# Patient Record
Sex: MALE | Race: BLACK OR AFRICAN AMERICAN | NOT HISPANIC OR LATINO | URBAN - METROPOLITAN AREA
[De-identification: names, ages, dates, MRNs, and addresses within clinical notes are randomized per-mention and may not be internally consistent; named-entity substitution may affect disease eponyms.]

---

## 2021-09-29 ENCOUNTER — INPATIENT (INPATIENT)
Facility: HOSPITAL | Age: 81
LOS: 2 days | Discharge: HOME | End: 2021-10-02
Attending: INTERNAL MEDICINE | Admitting: INTERNAL MEDICINE
Payer: MEDICARE

## 2021-09-29 VITALS
DIASTOLIC BLOOD PRESSURE: 102 MMHG | HEART RATE: 79 BPM | OXYGEN SATURATION: 99 % | RESPIRATION RATE: 17 BRPM | HEIGHT: 68 IN | TEMPERATURE: 98 F | SYSTOLIC BLOOD PRESSURE: 169 MMHG | WEIGHT: 154.1 LBS

## 2021-09-29 DIAGNOSIS — K62.5 HEMORRHAGE OF ANUS AND RECTUM: ICD-10-CM

## 2021-09-29 DIAGNOSIS — I10 ESSENTIAL (PRIMARY) HYPERTENSION: ICD-10-CM

## 2021-09-29 DIAGNOSIS — K52.9 NONINFECTIVE GASTROENTERITIS AND COLITIS, UNSPECIFIED: ICD-10-CM

## 2021-09-29 DIAGNOSIS — Z85.038 PERSONAL HISTORY OF OTHER MALIGNANT NEOPLASM OF LARGE INTESTINE: ICD-10-CM

## 2021-09-29 LAB
ALBUMIN SERPL ELPH-MCNC: 3.7 G/DL — SIGNIFICANT CHANGE UP (ref 3.3–5)
ALP SERPL-CCNC: 63 U/L — SIGNIFICANT CHANGE UP (ref 40–120)
ALT FLD-CCNC: 25 U/L — SIGNIFICANT CHANGE UP (ref 12–78)
ANION GAP SERPL CALC-SCNC: 4 MMOL/L — LOW (ref 5–17)
APPEARANCE UR: CLEAR — SIGNIFICANT CHANGE UP
AST SERPL-CCNC: 15 U/L — SIGNIFICANT CHANGE UP (ref 15–37)
BASOPHILS # BLD AUTO: 0.02 K/UL — SIGNIFICANT CHANGE UP (ref 0–0.2)
BASOPHILS NFR BLD AUTO: 0.3 % — SIGNIFICANT CHANGE UP (ref 0–2)
BILIRUB SERPL-MCNC: 0.9 MG/DL — SIGNIFICANT CHANGE UP (ref 0.2–1.2)
BILIRUB UR-MCNC: NEGATIVE — SIGNIFICANT CHANGE UP
BUN SERPL-MCNC: 17 MG/DL — SIGNIFICANT CHANGE UP (ref 7–23)
CALCIUM SERPL-MCNC: 9 MG/DL — SIGNIFICANT CHANGE UP (ref 8.5–10.1)
CHLORIDE SERPL-SCNC: 103 MMOL/L — SIGNIFICANT CHANGE UP (ref 96–108)
CO2 SERPL-SCNC: 30 MMOL/L — SIGNIFICANT CHANGE UP (ref 22–31)
COLOR SPEC: YELLOW — SIGNIFICANT CHANGE UP
CREAT SERPL-MCNC: 1.24 MG/DL — SIGNIFICANT CHANGE UP (ref 0.5–1.3)
DIFF PNL FLD: NEGATIVE — SIGNIFICANT CHANGE UP
EOSINOPHIL # BLD AUTO: 0.08 K/UL — SIGNIFICANT CHANGE UP (ref 0–0.5)
EOSINOPHIL NFR BLD AUTO: 1.1 % — SIGNIFICANT CHANGE UP (ref 0–6)
FLUAV AG NPH QL: SIGNIFICANT CHANGE UP
FLUBV AG NPH QL: SIGNIFICANT CHANGE UP
GLUCOSE SERPL-MCNC: 133 MG/DL — HIGH (ref 70–99)
GLUCOSE UR QL: NEGATIVE MG/DL — SIGNIFICANT CHANGE UP
HCT VFR BLD CALC: 40.7 % — SIGNIFICANT CHANGE UP (ref 39–50)
HGB BLD-MCNC: 13.9 G/DL — SIGNIFICANT CHANGE UP (ref 13–17)
IMM GRANULOCYTES NFR BLD AUTO: 0.1 % — SIGNIFICANT CHANGE UP (ref 0–1.5)
KETONES UR-MCNC: NEGATIVE — SIGNIFICANT CHANGE UP
LACTATE SERPL-SCNC: 1.7 MMOL/L — SIGNIFICANT CHANGE UP (ref 0.7–2)
LEUKOCYTE ESTERASE UR-ACNC: NEGATIVE — SIGNIFICANT CHANGE UP
LYMPHOCYTES # BLD AUTO: 1.42 K/UL — SIGNIFICANT CHANGE UP (ref 1–3.3)
LYMPHOCYTES # BLD AUTO: 18.8 % — SIGNIFICANT CHANGE UP (ref 13–44)
MCHC RBC-ENTMCNC: 29.7 PG — SIGNIFICANT CHANGE UP (ref 27–34)
MCHC RBC-ENTMCNC: 34.2 GM/DL — SIGNIFICANT CHANGE UP (ref 32–36)
MCV RBC AUTO: 87 FL — SIGNIFICANT CHANGE UP (ref 80–100)
MONOCYTES # BLD AUTO: 0.55 K/UL — SIGNIFICANT CHANGE UP (ref 0–0.9)
MONOCYTES NFR BLD AUTO: 7.3 % — SIGNIFICANT CHANGE UP (ref 2–14)
NEUTROPHILS # BLD AUTO: 5.46 K/UL — SIGNIFICANT CHANGE UP (ref 1.8–7.4)
NEUTROPHILS NFR BLD AUTO: 72.4 % — SIGNIFICANT CHANGE UP (ref 43–77)
NITRITE UR-MCNC: NEGATIVE — SIGNIFICANT CHANGE UP
NRBC # BLD: 0 /100 WBCS — SIGNIFICANT CHANGE UP (ref 0–0)
OB PNL STL: POSITIVE
PH UR: 5 — SIGNIFICANT CHANGE UP (ref 5–8)
PLATELET # BLD AUTO: 155 K/UL — SIGNIFICANT CHANGE UP (ref 150–400)
POTASSIUM SERPL-MCNC: 3.8 MMOL/L — SIGNIFICANT CHANGE UP (ref 3.5–5.3)
POTASSIUM SERPL-SCNC: 3.8 MMOL/L — SIGNIFICANT CHANGE UP (ref 3.5–5.3)
PROT SERPL-MCNC: 8 GM/DL — SIGNIFICANT CHANGE UP (ref 6–8.3)
PROT UR-MCNC: NEGATIVE MG/DL — SIGNIFICANT CHANGE UP
RBC # BLD: 4.68 M/UL — SIGNIFICANT CHANGE UP (ref 4.2–5.8)
RBC # FLD: 14.4 % — SIGNIFICANT CHANGE UP (ref 10.3–14.5)
SARS-COV-2 RNA SPEC QL NAA+PROBE: SIGNIFICANT CHANGE UP
SODIUM SERPL-SCNC: 137 MMOL/L — SIGNIFICANT CHANGE UP (ref 135–145)
SP GR SPEC: 1.01 — SIGNIFICANT CHANGE UP (ref 1.01–1.02)
UROBILINOGEN FLD QL: NEGATIVE MG/DL — SIGNIFICANT CHANGE UP
WBC # BLD: 7.54 K/UL — SIGNIFICANT CHANGE UP (ref 3.8–10.5)
WBC # FLD AUTO: 7.54 K/UL — SIGNIFICANT CHANGE UP (ref 3.8–10.5)

## 2021-09-29 PROCEDURE — 99285 EMERGENCY DEPT VISIT HI MDM: CPT

## 2021-09-29 PROCEDURE — 99223 1ST HOSP IP/OBS HIGH 75: CPT

## 2021-09-29 PROCEDURE — 74177 CT ABD & PELVIS W/CONTRAST: CPT | Mod: 26,MA

## 2021-09-29 RX ORDER — CEFTRIAXONE 500 MG/1
1000 INJECTION, POWDER, FOR SOLUTION INTRAMUSCULAR; INTRAVENOUS EVERY 24 HOURS
Refills: 0 | Status: DISCONTINUED | OUTPATIENT
Start: 2021-09-29 | End: 2021-09-30

## 2021-09-29 RX ORDER — PIPERACILLIN AND TAZOBACTAM 4; .5 G/20ML; G/20ML
3.38 INJECTION, POWDER, LYOPHILIZED, FOR SOLUTION INTRAVENOUS ONCE
Refills: 0 | Status: COMPLETED | OUTPATIENT
Start: 2021-09-29 | End: 2021-09-29

## 2021-09-29 RX ORDER — ONDANSETRON 8 MG/1
4 TABLET, FILM COATED ORAL EVERY 8 HOURS
Refills: 0 | Status: DISCONTINUED | OUTPATIENT
Start: 2021-09-29 | End: 2021-10-02

## 2021-09-29 RX ORDER — SODIUM CHLORIDE 9 MG/ML
1000 INJECTION, SOLUTION INTRAVENOUS
Refills: 0 | Status: DISCONTINUED | OUTPATIENT
Start: 2021-09-29 | End: 2021-10-01

## 2021-09-29 RX ORDER — ACETAMINOPHEN 500 MG
650 TABLET ORAL EVERY 6 HOURS
Refills: 0 | Status: DISCONTINUED | OUTPATIENT
Start: 2021-09-29 | End: 2021-10-02

## 2021-09-29 RX ORDER — LANOLIN ALCOHOL/MO/W.PET/CERES
3 CREAM (GRAM) TOPICAL AT BEDTIME
Refills: 0 | Status: DISCONTINUED | OUTPATIENT
Start: 2021-09-29 | End: 2021-10-02

## 2021-09-29 RX ORDER — METRONIDAZOLE 500 MG
500 TABLET ORAL EVERY 8 HOURS
Refills: 0 | Status: DISCONTINUED | OUTPATIENT
Start: 2021-09-29 | End: 2021-10-02

## 2021-09-29 RX ADMIN — PIPERACILLIN AND TAZOBACTAM 200 GRAM(S): 4; .5 INJECTION, POWDER, LYOPHILIZED, FOR SOLUTION INTRAVENOUS at 16:45

## 2021-09-29 RX ADMIN — SODIUM CHLORIDE 75 MILLILITER(S): 9 INJECTION, SOLUTION INTRAVENOUS at 19:53

## 2021-09-29 RX ADMIN — SODIUM CHLORIDE 75 MILLILITER(S): 9 INJECTION, SOLUTION INTRAVENOUS at 21:31

## 2021-09-29 RX ADMIN — Medication 100 MILLIGRAM(S): at 21:30

## 2021-09-29 RX ADMIN — CEFTRIAXONE 100 MILLIGRAM(S): 500 INJECTION, POWDER, FOR SOLUTION INTRAMUSCULAR; INTRAVENOUS at 19:52

## 2021-09-29 NOTE — ED ADULT NURSE NOTE - CHIEF COMPLAINT QUOTE
pt a&O x4, pt c.o abd pain 9/10. no n/v/d. last BM yesterday with blood. Not passing gas, abd distended/bloated. no fever chills, PMH prostate and colon CA 2014.  no current home medications. pt also c.o fo rectal pain worsening during BM.

## 2021-09-29 NOTE — H&P ADULT - NSHPPHYSICALEXAM_GEN_ALL_CORE
CONSTITUTIONAL: Well developed, well nourished, alert and cooperative, no acute distress. BP-141/89 , HR-79 , RR-17  EYES: PERRL, EOMI, no scleral icterus  ENT: Mucosa moist, tongue normal.  NECK: Neck supple, trachea midline, non-tender, no masses or thyromegaly.  CARDIAC: Normal S1 and S2. Regular rate and rhythms. No murmurs. No JVD. No Pedal edema. Peripheral pulses intact  LUNGS: Clear to auscultation, equal air entry both lungs. No rales, rhonchi, wheezing. Normal respiratory effort.   ABDOMEN: Soft, but noted mild tenderness in lower abdomen region, more on left side. No guarding or rebound tenderness. No hepatomegaly or splenomegaly. Bowel sound normal  MUSCULOSKELETAL: Normocephalic, atraumatic. Spine normal without deformity or tenderness, no CVA tenderness. No clubbing or cyanosis. No significant deformity or joint abnormality. No varicosities.  NEUROLOGICAL: No gross motor or sensory deficits. CN II-XII grossly intact. DTR normal  SKIN: no lesions or eruptions. Normal turgor  LYMPHATICS: No cervical, axillary, inguinal lymphadenopathy  PSYCHIATRIC: A&O x 3, appropriate mood and affect. Normal insight and judgement. CONSTITUTIONAL: Well developed, well nourished, alert and cooperative, no acute distress. BP-141/89 , HR-79 , RR-17  EYES: PERRL, EOMI, no scleral icterus  ENT: Mucosa moist, tongue normal.  NECK: Neck supple, trachea midline, non-tender, no masses or thyromegaly.  CARDIAC: Normal S1 and S2. Regular rate and rhythms. No murmurs. No JVD. No Pedal edema. Peripheral pulses intact  LUNGS: Clear to auscultation, equal air entry both lungs. No rales, rhonchi, wheezing. Normal respiratory effort.   ABDOMEN: Soft, but noted mild tenderness in lower abdomen region, more on left side. No guarding or rebound tenderness. No hepatomegaly or splenomegaly. Bowel sound normal  MUSCULOSKELETAL: Normocephalic, atraumatic. Spine normal without deformity or tenderness. No clubbing or cyanosis. No significant deformity or joint abnormality  NEUROLOGICAL: No gross motor or sensory deficits  SKIN: no lesions or eruptions. Normal turgor  PSYCHIATRIC: A&O x 3, appropriate mood and affect. Normal insight and judgement.

## 2021-09-29 NOTE — ED ADULT NURSE REASSESSMENT NOTE - NS ED NURSE REASSESS COMMENT FT1
pt is in bed, AX4, all results are in and md david called in to discuss, most likely to be admitted.

## 2021-09-29 NOTE — H&P ADULT - PROBLEM SELECTOR PLAN 2
Reported seeing a few drop of blood with last 2 bowel movement  Hb-13.9, plt-155  Fecal occult blood positive  Likely due to colitis  Continue to monitor  If worsen, consider GI consult in AM, otherwise outpatient GI follow up

## 2021-09-29 NOTE — ED ADULT NURSE NOTE - OBJECTIVE STATEMENT
assisting primary RN. Patient presents awake, alert and oriented x4 complaining of generalized abdominal and rectal pain since yesterday afternoon. Endorses drops of blood in stool. Alie nvd, chest pain, dizziness, fevers. PMH colon cancer (2013), prostate cancer (2013), PSH open heart surgery, NKA.

## 2021-09-29 NOTE — H&P ADULT - HISTORY OF PRESENT ILLNESS
80 years old male with h/o HTN ( not on meds), h/o colon ca (2013, s/p resection and chemo) and prostate cancer (2015 s/o cyberknife treatment) present to ED with complain of severe abdominal pain for 1 day. Patient reported constant, 10/10, sharp lower abdominal pain, associated with a few drop of blood in his bowel movement yesterday. Denied any fever or chills. No nausea or vomiting.   Hemodynamically stable in ED, afebrile. No leukocytosis, Hb-13.9, plt-155, lactate 1.7, fecal occult blood positive. CT abd/pelvis reported severe colitis involving mid to distal descending colon and rectosigmoid colon. 2 small nonspecific splenic lesions in the superior spleen. 2 tiny hypodensities in the left hepatic lobe of unlikely clinical significance.Small left adrenal nodule should be followed with CT or MR in 12 months.   Patient had another bowel movement in ED, also notice a few drop of blood per patient. Patient was given 1 dose of Zosyn in ED.       PMH: as in HPI  PSH: colon cancer surgery, prostate cyberknife surgery  SH: no toxic habits  FH: mother-HTN  Allergy: NKDA  Med: not taking any prescription

## 2021-09-29 NOTE — ED PROVIDER NOTE - OBJECTIVE STATEMENT
80 year old male with h/o HTN, colon ca (2013, s/p resection and chemo) and prostate cancer (2015 s/o cyberknife treatment) presnts today accompanied with his wife c/o lower abdominal pain and bloating since last night associated with no bowel movement, his wife reports that he was in pain and felt warm so she gave him tylenol, pt also noted red blood with his bowel movement 80 year old male with h/o colon ca (2013, s/p resection and chemo) and prostate cancer (2015 s/o cyberknife treatment) presnts today accompanied with his wife c/o lower abdominal pain and bloating since last night associated with no bowel movement, his wife reports that he was in pain and felt warm so she gave him tylenol, pt also noted red blood with his bowel movement

## 2021-09-29 NOTE — H&P ADULT - PROBLEM SELECTOR PLAN 1
Present to ED with severe abd pain  CT abd/pelvis reported severe colitis involving mid to distal descending colon and rectosigmoid colon  Received Zosyn in ED  Ceftriaxone and metronidazole   Follow blood culture  Clear liquid diet and advance as tolerated  Gentle IV hydration

## 2021-09-29 NOTE — ED ADULT NURSE NOTE - NSIMPLEMENTINTERV_GEN_ALL_ED
Implemented All Universal Safety Interventions:  [place] to call system. Call bell, personal items and telephone within reach. Instruct patient to call for assistance. Room bathroom lighting operational. Non-slip footwear when patient is off stretcher. Physically safe environment: no spills, clutter or unnecessary equipment. Stretcher in lowest position, wheels locked, appropriate side rails in place.

## 2021-09-29 NOTE — H&P ADULT - NSHPREVIEWOFSYSTEMS_GEN_ALL_CORE
CONSTITUTIONAL: No fever, chills or weight loss  EYES: No eye pain. No vision changes  ENT:  No hearing changes or pain, No nasal congestion.  Cardiovascular:  No Chest Pain, palpitation, SOB orthopnea or PND. No extremity swelling  Respiratory:  No cough, SOB or wheezing. No hemoptysis.   Gastrointestinal:  severe lower abdominal pain, a few drop of blood with bowel movement  Genitourinary: No dysuria, frequency or hematuria  Musculoskeletal:  No myalgia or joint pain  Skin:  No skin lesion, rash or pruritis  Neuro:  No weakness, numbness, loss of consciousness, syncope, dizziness or seizures  Psych:  No anxiety, depression or insomnia. No hallucination. No recent changes in mood  Heme/Lymph:  No easy bruising or bleeding. No enlarged lymph nodes  Endocrine:  No Polyuria, polydipsia, No heat or cold intolerance.  ALLERGIC/IMMUNOlOGIC: No seasonal allergy, hives, hay fever symptoms

## 2021-09-29 NOTE — H&P ADULT - ASSESSMENT
80 years old male with h/o HTN ( not on meds), h/o colon ca (2013, s/p resection and chemo) and prostate cancer (2015 s/o cyberknife treatment) present to ED with complain of severe abdominal pain for 1 day.  Hemodynamically stable in ED, afebrile. No leukocytosis, Hb-13.9, plt-155, lactate 1.7, fecal occult blood positive. CT abd/pelvis reported severe colitis involving mid to distal descending colon and rectosigmoid colon. 2 small nonspecific splenic lesions in the superior spleen. 2 tiny hypodensities in the left hepatic lobe of unlikely clinical significance. Small left adrenal nodule should be followed with CT or MR in 12 months.   Patient had another bowel movement in ED, also notice a few drop of blood per patient. Patient was given 1 dose of Zosyn in ED    Admitted with bright red blood per rectum with severe colitis

## 2021-09-30 LAB
ANION GAP SERPL CALC-SCNC: 5 MMOL/L — SIGNIFICANT CHANGE UP (ref 5–17)
BUN SERPL-MCNC: 14 MG/DL — SIGNIFICANT CHANGE UP (ref 7–23)
CALCIUM SERPL-MCNC: 8.6 MG/DL — SIGNIFICANT CHANGE UP (ref 8.5–10.1)
CHLORIDE SERPL-SCNC: 106 MMOL/L — SIGNIFICANT CHANGE UP (ref 96–108)
CO2 SERPL-SCNC: 28 MMOL/L — SIGNIFICANT CHANGE UP (ref 22–31)
CREAT SERPL-MCNC: 0.97 MG/DL — SIGNIFICANT CHANGE UP (ref 0.5–1.3)
CULTURE RESULTS: SIGNIFICANT CHANGE UP
GLUCOSE SERPL-MCNC: 87 MG/DL — SIGNIFICANT CHANGE UP (ref 70–99)
HCT VFR BLD CALC: 39.3 % — SIGNIFICANT CHANGE UP (ref 39–50)
HGB BLD-MCNC: 13 G/DL — SIGNIFICANT CHANGE UP (ref 13–17)
MAGNESIUM SERPL-MCNC: 2.2 MG/DL — SIGNIFICANT CHANGE UP (ref 1.6–2.6)
MCHC RBC-ENTMCNC: 29.4 PG — SIGNIFICANT CHANGE UP (ref 27–34)
MCHC RBC-ENTMCNC: 33.1 GM/DL — SIGNIFICANT CHANGE UP (ref 32–36)
MCV RBC AUTO: 88.9 FL — SIGNIFICANT CHANGE UP (ref 80–100)
NRBC # BLD: 0 /100 WBCS — SIGNIFICANT CHANGE UP (ref 0–0)
PHOSPHATE SERPL-MCNC: 3.1 MG/DL — SIGNIFICANT CHANGE UP (ref 2.5–4.5)
PLATELET # BLD AUTO: 133 K/UL — LOW (ref 150–400)
POTASSIUM SERPL-MCNC: 3.5 MMOL/L — SIGNIFICANT CHANGE UP (ref 3.5–5.3)
POTASSIUM SERPL-SCNC: 3.5 MMOL/L — SIGNIFICANT CHANGE UP (ref 3.5–5.3)
RBC # BLD: 4.42 M/UL — SIGNIFICANT CHANGE UP (ref 4.2–5.8)
RBC # FLD: 14.4 % — SIGNIFICANT CHANGE UP (ref 10.3–14.5)
SODIUM SERPL-SCNC: 139 MMOL/L — SIGNIFICANT CHANGE UP (ref 135–145)
SPECIMEN SOURCE: SIGNIFICANT CHANGE UP
WBC # BLD: 6.88 K/UL — SIGNIFICANT CHANGE UP (ref 3.8–10.5)
WBC # FLD AUTO: 6.88 K/UL — SIGNIFICANT CHANGE UP (ref 3.8–10.5)

## 2021-09-30 PROCEDURE — 99232 SBSQ HOSP IP/OBS MODERATE 35: CPT

## 2021-09-30 RX ORDER — CIPROFLOXACIN LACTATE 400MG/40ML
400 VIAL (ML) INTRAVENOUS EVERY 12 HOURS
Refills: 0 | Status: DISCONTINUED | OUTPATIENT
Start: 2021-09-30 | End: 2021-10-02

## 2021-09-30 RX ORDER — INFLUENZA VIRUS VACCINE 15; 15; 15; 15 UG/.5ML; UG/.5ML; UG/.5ML; UG/.5ML
0.5 SUSPENSION INTRAMUSCULAR ONCE
Refills: 0 | Status: DISCONTINUED | OUTPATIENT
Start: 2021-09-30 | End: 2021-10-02

## 2021-09-30 RX ORDER — LIDOCAINE 4 G/100G
1 CREAM TOPICAL EVERY 12 HOURS
Refills: 0 | Status: DISCONTINUED | OUTPATIENT
Start: 2021-09-30 | End: 2021-10-02

## 2021-09-30 RX ORDER — LISINOPRIL 2.5 MG/1
5 TABLET ORAL DAILY
Refills: 0 | Status: DISCONTINUED | OUTPATIENT
Start: 2021-09-30 | End: 2021-10-02

## 2021-09-30 RX ORDER — HYDROCORTISONE 1 %
1 OINTMENT (GRAM) TOPICAL DAILY
Refills: 0 | Status: DISCONTINUED | OUTPATIENT
Start: 2021-09-30 | End: 2021-10-02

## 2021-09-30 RX ADMIN — Medication 1 SUPPOSITORY(S): at 21:09

## 2021-09-30 RX ADMIN — Medication 200 MILLIGRAM(S): at 19:08

## 2021-09-30 RX ADMIN — Medication 100 MILLIGRAM(S): at 13:35

## 2021-09-30 RX ADMIN — LISINOPRIL 5 MILLIGRAM(S): 2.5 TABLET ORAL at 19:08

## 2021-09-30 RX ADMIN — Medication 100 MILLIGRAM(S): at 21:09

## 2021-09-30 RX ADMIN — Medication 100 MILLIGRAM(S): at 05:22

## 2021-09-30 RX ADMIN — CEFTRIAXONE 100 MILLIGRAM(S): 500 INJECTION, POWDER, FOR SOLUTION INTRAMUSCULAR; INTRAVENOUS at 17:20

## 2021-09-30 RX ADMIN — LIDOCAINE 1 APPLICATION(S): 4 CREAM TOPICAL at 19:08

## 2021-09-30 NOTE — PROGRESS NOTE ADULT - ASSESSMENT
80 years old male with h/o HTN ( not on meds), h/o colon ca (2013, s/p resection and chemo) and prostate cancer (2015 s/o cyberknife treatment) present to ED with complain of severe abdominal pain for 1 day.  H & H has been stable and no rectal bleeding. May get an outpatient GI evaluation.  If still stable in am and tolerating diet may switch to PO antibiotics and d/c home.

## 2021-09-30 NOTE — PROGRESS NOTE ADULT - PROBLEM SELECTOR PLAN 2
Chest tightness and discomfort for about 2 hours, activity and movement make tightness worse, also worse with deep breaths. Denies hx asthma or cardiac hx. Also c/o some SOB and a HA that has since resolved.   Hb-13.9, plt-155  Fecal occult blood positive  Likely due to colitis  Continue to monitor  If worsen, consider GI consult in AM, otherwise outpatient GI follow up

## 2021-09-30 NOTE — PROGRESS NOTE ADULT - PROBLEM SELECTOR PLAN 1
CT abd/pelvis reported severe colitis involving mid to distal descending colon and rectosigmoid colon  Continue cipro and metronidazole   Follow blood culture  Clear liquid diet and advance as tolerated  Gentle IV hydration

## 2021-10-01 LAB
ALBUMIN SERPL ELPH-MCNC: 3.1 G/DL — LOW (ref 3.3–5)
ALP SERPL-CCNC: 54 U/L — SIGNIFICANT CHANGE UP (ref 40–120)
ALT FLD-CCNC: 17 U/L — SIGNIFICANT CHANGE UP (ref 12–78)
ANION GAP SERPL CALC-SCNC: 3 MMOL/L — LOW (ref 5–17)
AST SERPL-CCNC: 23 U/L — SIGNIFICANT CHANGE UP (ref 15–37)
BASOPHILS # BLD AUTO: 0.01 K/UL — SIGNIFICANT CHANGE UP (ref 0–0.2)
BASOPHILS NFR BLD AUTO: 0.2 % — SIGNIFICANT CHANGE UP (ref 0–2)
BILIRUB SERPL-MCNC: 0.7 MG/DL — SIGNIFICANT CHANGE UP (ref 0.2–1.2)
BUN SERPL-MCNC: 12 MG/DL — SIGNIFICANT CHANGE UP (ref 7–23)
CALCIUM SERPL-MCNC: 9.3 MG/DL — SIGNIFICANT CHANGE UP (ref 8.5–10.1)
CHLORIDE SERPL-SCNC: 103 MMOL/L — SIGNIFICANT CHANGE UP (ref 96–108)
CO2 SERPL-SCNC: 30 MMOL/L — SIGNIFICANT CHANGE UP (ref 22–31)
CREAT SERPL-MCNC: 0.87 MG/DL — SIGNIFICANT CHANGE UP (ref 0.5–1.3)
EOSINOPHIL # BLD AUTO: 0.28 K/UL — SIGNIFICANT CHANGE UP (ref 0–0.5)
EOSINOPHIL NFR BLD AUTO: 5.1 % — SIGNIFICANT CHANGE UP (ref 0–6)
GLUCOSE SERPL-MCNC: 125 MG/DL — HIGH (ref 70–99)
HCT VFR BLD CALC: 40.7 % — SIGNIFICANT CHANGE UP (ref 39–50)
HGB BLD-MCNC: 13.4 G/DL — SIGNIFICANT CHANGE UP (ref 13–17)
IMM GRANULOCYTES NFR BLD AUTO: 0.2 % — SIGNIFICANT CHANGE UP (ref 0–1.5)
LYMPHOCYTES # BLD AUTO: 1.46 K/UL — SIGNIFICANT CHANGE UP (ref 1–3.3)
LYMPHOCYTES # BLD AUTO: 26.4 % — SIGNIFICANT CHANGE UP (ref 13–44)
MCHC RBC-ENTMCNC: 29.3 PG — SIGNIFICANT CHANGE UP (ref 27–34)
MCHC RBC-ENTMCNC: 32.9 GM/DL — SIGNIFICANT CHANGE UP (ref 32–36)
MCV RBC AUTO: 88.9 FL — SIGNIFICANT CHANGE UP (ref 80–100)
MONOCYTES # BLD AUTO: 0.51 K/UL — SIGNIFICANT CHANGE UP (ref 0–0.9)
MONOCYTES NFR BLD AUTO: 9.2 % — SIGNIFICANT CHANGE UP (ref 2–14)
NEUTROPHILS # BLD AUTO: 3.25 K/UL — SIGNIFICANT CHANGE UP (ref 1.8–7.4)
NEUTROPHILS NFR BLD AUTO: 58.9 % — SIGNIFICANT CHANGE UP (ref 43–77)
NRBC # BLD: 0 /100 WBCS — SIGNIFICANT CHANGE UP (ref 0–0)
PLATELET # BLD AUTO: 141 K/UL — LOW (ref 150–400)
POTASSIUM SERPL-MCNC: 3.9 MMOL/L — SIGNIFICANT CHANGE UP (ref 3.5–5.3)
POTASSIUM SERPL-SCNC: 3.9 MMOL/L — SIGNIFICANT CHANGE UP (ref 3.5–5.3)
PROT SERPL-MCNC: 7.1 GM/DL — SIGNIFICANT CHANGE UP (ref 6–8.3)
RBC # BLD: 4.58 M/UL — SIGNIFICANT CHANGE UP (ref 4.2–5.8)
RBC # FLD: 14.3 % — SIGNIFICANT CHANGE UP (ref 10.3–14.5)
SODIUM SERPL-SCNC: 136 MMOL/L — SIGNIFICANT CHANGE UP (ref 135–145)
WBC # BLD: 5.52 K/UL — SIGNIFICANT CHANGE UP (ref 3.8–10.5)
WBC # FLD AUTO: 5.52 K/UL — SIGNIFICANT CHANGE UP (ref 3.8–10.5)

## 2021-10-01 PROCEDURE — 99232 SBSQ HOSP IP/OBS MODERATE 35: CPT

## 2021-10-01 RX ORDER — AMLODIPINE BESYLATE 2.5 MG/1
5 TABLET ORAL DAILY
Refills: 0 | Status: DISCONTINUED | OUTPATIENT
Start: 2021-10-01 | End: 2021-10-02

## 2021-10-01 RX ADMIN — LIDOCAINE 1 APPLICATION(S): 4 CREAM TOPICAL at 05:50

## 2021-10-01 RX ADMIN — Medication 100 MILLIGRAM(S): at 05:49

## 2021-10-01 RX ADMIN — LIDOCAINE 1 APPLICATION(S): 4 CREAM TOPICAL at 17:40

## 2021-10-01 RX ADMIN — Medication 200 MILLIGRAM(S): at 06:25

## 2021-10-01 RX ADMIN — Medication 100 MILLIGRAM(S): at 15:22

## 2021-10-01 RX ADMIN — Medication 100 MILLIGRAM(S): at 23:01

## 2021-10-01 RX ADMIN — Medication 200 MILLIGRAM(S): at 17:41

## 2021-10-01 RX ADMIN — LISINOPRIL 5 MILLIGRAM(S): 2.5 TABLET ORAL at 05:49

## 2021-10-01 RX ADMIN — Medication 1 SUPPOSITORY(S): at 15:23

## 2021-10-01 NOTE — PROGRESS NOTE ADULT - ASSESSMENT
80 years old male with h/o HTN ( not on meds), h/o colon ca (2013, s/p resection and chemo) and prostate cancer (2015 s/o cyberknife treatment) present to ED with complain of severe abdominal pain for 1 day.  Found to have Colitis on CT.     Problem/Plan - 1:  ·  Problem: Acute colitis.   ·  Plan: CT abd/pelvis reported severe colitis involving mid to distal descending colon and rectosigmoid colon  Continue cipro and metronidazole   Follow blood culture.  Advance diet.  D/c IVF.    Emphasized need for      Problem/Plan - 2:  ·  Problem: Bright red blood per rectum.   Fecal occult blood positive, Likely due to colitis.   No s/s of gross bleeding.  H/H stable.      Problem/Plan - 3:  ·  Problem: Benign essential HTN.   ·  Plan: Will start on lisinopril 5 mg daily Monitor BP.     Problem/Plan - 4:  ·  Problem: History of colon cancer.   ·  Plan: h/o colon ca (2013, s/p resection and chemo).    # Inpatient DVT Prophylaxis - Lower extremity intermittent compression devices.   Likely d/c home in am if symptoms stable.

## 2021-10-02 VITALS
SYSTOLIC BLOOD PRESSURE: 134 MMHG | RESPIRATION RATE: 18 BRPM | HEART RATE: 70 BPM | TEMPERATURE: 98 F | DIASTOLIC BLOOD PRESSURE: 71 MMHG | OXYGEN SATURATION: 99 %

## 2021-10-02 PROCEDURE — 99232 SBSQ HOSP IP/OBS MODERATE 35: CPT

## 2021-10-02 PROCEDURE — 93010 ELECTROCARDIOGRAM REPORT: CPT

## 2021-10-02 RX ORDER — MOXIFLOXACIN HYDROCHLORIDE TABLETS, 400 MG 400 MG/1
1 TABLET, FILM COATED ORAL
Qty: 20 | Refills: 0
Start: 2021-10-02 | End: 2021-10-11

## 2021-10-02 RX ORDER — HYDRALAZINE HCL 50 MG
10 TABLET ORAL ONCE
Refills: 0 | Status: COMPLETED | OUTPATIENT
Start: 2021-10-02 | End: 2021-10-02

## 2021-10-02 RX ORDER — METRONIDAZOLE 500 MG
1 TABLET ORAL
Qty: 30 | Refills: 0
Start: 2021-10-02 | End: 2021-10-11

## 2021-10-02 RX ORDER — AMLODIPINE BESYLATE 2.5 MG/1
5 TABLET ORAL ONCE
Refills: 0 | Status: COMPLETED | OUTPATIENT
Start: 2021-10-02 | End: 2021-10-02

## 2021-10-02 RX ORDER — AMLODIPINE BESYLATE 2.5 MG/1
1 TABLET ORAL
Qty: 30 | Refills: 0
Start: 2021-10-02 | End: 2021-10-31

## 2021-10-02 RX ORDER — LISINOPRIL 2.5 MG/1
20 TABLET ORAL ONCE
Refills: 0 | Status: DISCONTINUED | OUTPATIENT
Start: 2021-10-02 | End: 2021-10-02

## 2021-10-02 RX ORDER — LISINOPRIL 2.5 MG/1
1 TABLET ORAL
Qty: 30 | Refills: 0
Start: 2021-10-02

## 2021-10-02 RX ADMIN — Medication 650 MILLIGRAM(S): at 08:33

## 2021-10-02 RX ADMIN — AMLODIPINE BESYLATE 5 MILLIGRAM(S): 2.5 TABLET ORAL at 12:29

## 2021-10-02 RX ADMIN — LISINOPRIL 5 MILLIGRAM(S): 2.5 TABLET ORAL at 05:25

## 2021-10-02 RX ADMIN — Medication 10 MILLIGRAM(S): at 12:29

## 2021-10-02 RX ADMIN — Medication 1 SUPPOSITORY(S): at 14:36

## 2021-10-02 RX ADMIN — Medication 100 MILLIGRAM(S): at 05:11

## 2021-10-02 RX ADMIN — Medication 100 MILLIGRAM(S): at 14:36

## 2021-10-02 RX ADMIN — Medication 200 MILLIGRAM(S): at 05:11

## 2021-10-02 RX ADMIN — LIDOCAINE 1 APPLICATION(S): 4 CREAM TOPICAL at 05:25

## 2021-10-02 RX ADMIN — AMLODIPINE BESYLATE 5 MILLIGRAM(S): 2.5 TABLET ORAL at 05:25

## 2021-10-02 RX ADMIN — Medication 650 MILLIGRAM(S): at 09:33

## 2021-10-02 NOTE — DISCHARGE NOTE PROVIDER - NSDCFUADDINST_GEN_ALL_CORE_FT
It is important to see your primary physician as well as the physicians noted below within the next week to perform a comprehensive medical review.  Call their offices for an appointment as soon as you leave the hospital.  You will also need to see them for renewal of your medications.  If you do not have a primary physician, contact the [facility] Physician Referral Service at [phone].  To obtain your results, you can access the [employer] Patient Portal at [email or web address].  Your medical issues appear to be stable at this time, but if your symptoms recur or worsen, contact your physicians and/or return to the hospital if necessary.  If you encounter any issues or questions with your medication, call your physicians before stopping the medication.  Do not drive.  Limit your diet to 2 grams of sodium daily.

## 2021-10-02 NOTE — DISCHARGE NOTE PROVIDER - HOSPITAL COURSE
80 years old male with h/o HTN ( not on meds), h/o colon ca (2013, s/p resection and chemo) and prostate cancer (2015 s/o cyberknife treatment) present to ED with complain of severe abdominal pain for 1 day.  Found to have Colitis on CT.     Problem/Plan - 1:  ·  Problem: Acute colitis.   ·  Plan: CT abd/pelvis reported severe colitis involving mid to distal descending colon and rectosigmoid colon  Continue cipro and metronidazole   Advanced diet. Emphasized need for outpatient GI followup for colonoscopy and further evaluation for possibilities including malignancy.  He acknowledges understanding, states last colonoscopy ~ 10y ago.      Problem/Plan - 2:  ·  Problem: Bright red blood per rectum.   Fecal occult blood positive, Likely due to colitis.   No s/s of gross bleeding.  H/H stable.      Problem/Plan - 3:  ·  Problem: Benign essential HTN.   ·  Plan: BP elevated.  Adjust antihypertensives.      Problem/Plan - 4:  ·  Problem: History of colon cancer.   ·  Plan: h/o colon ca (2013, s/p resection and chemo).    # Inpatient DVT Prophylaxis - Lower extremity intermittent compression devices.   Plan d/c home once BP controlled.     Outpatient followup discussed.  Total time spent on discharge is  45  minutes. 80 years old male with h/o HTN ( not on meds), h/o colon ca (2013, s/p resection and chemo) and prostate cancer (2015 s/o cyberknife treatment) present to ED with complain of severe abdominal pain for 1 day.  Found to have Colitis on CT.     Problem/Plan - 1:  ·  Problem: Acute colitis.   ·  Plan: CT abd/pelvis reported severe colitis involving mid to distal descending colon and rectosigmoid colon  Continue cipro and metronidazole   Advanced diet. Emphasized need for outpatient GI followup for colonoscopy and further evaluation for possibilities including malignancy.  He acknowledges understanding, states last colonoscopy ~ 10y ago.      Problem/Plan - 2:  ·  Problem: Bright red blood per rectum.   Fecal occult blood positive, Likely due to colitis.   No s/s of gross bleeding.  H/H stable.      Problem/Plan - 3:  ·  Problem: Benign essential HTN.   ·  Plan: BP elevated.  Adjust antihypertensives.      Problem/Plan - 4:  ·  Problem: History of colon cancer.   ·  Plan: h/o colon ca (2013, s/p resection and chemo).    # Inpatient DVT Prophylaxis - Lower extremity intermittent compression devices.   Plan d/c home.  BP now stable.     Outpatient followup discussed.  Total time spent on discharge is  45  minutes.

## 2021-10-02 NOTE — DISCHARGE NOTE PROVIDER - NSDCMRMEDTOKEN_GEN_ALL_CORE_FT
amLODIPine 10 mg oral tablet: 1 tab(s) orally once a day   Cipro 500 mg oral tablet: 1 tab(s) orally every 12 hours   Flagyl 500 mg oral tablet: 1 tab(s) orally every 8 hours    amLODIPine 10 mg oral tablet: 1 tab(s) orally once a day   Cipro 500 mg oral tablet: 1 tab(s) orally every 12 hours   Flagyl 500 mg oral tablet: 1 tab(s) orally every 8 hours   lisinopril 5 mg oral tablet: 1 tab(s) orally once a day

## 2021-10-02 NOTE — DISCHARGE NOTE NURSING/CASE MANAGEMENT/SOCIAL WORK - PATIENT PORTAL LINK FT
You can access the FollowMyHealth Patient Portal offered by [facility] by registering at the following website: [email or web address]. By joining [employer]’s FollowMyHealth portal, you will also be able to view your health information using other applications (apps) compatible with our system.

## 2021-10-02 NOTE — PROGRESS NOTE ADULT - ASSESSMENT
80 years old male with h/o HTN ( not on meds), h/o colon ca (2013, s/p resection and chemo) and prostate cancer (2015 s/o cyberknife treatment) present to ED with complain of severe abdominal pain for 1 day.  Found to have Colitis on CT.     Problem/Plan - 1:  ·  Problem: Acute colitis.   ·  Plan: CT abd/pelvis reported severe colitis involving mid to distal descending colon and rectosigmoid colon  Continue cipro and metronidazole   Advanced diet. Emphasized need for outpatient GI followup for colonoscopy and further evaluation.       Problem/Plan - 2:  ·  Problem: Bright red blood per rectum.   Fecal occult blood positive, Likely due to colitis.   No s/s of gross bleeding.  H/H stable.      Problem/Plan - 3:  ·  Problem: Benign essential HTN.   ·  Plan: continue current BP control.      Problem/Plan - 4:  ·  Problem: History of colon cancer.   ·  Plan: h/o colon ca (2013, s/p resection and chemo).    # Inpatient DVT Prophylaxis - Lower extremity intermittent compression devices.   Stable for d/c home.    80 years old male with h/o HTN ( not on meds), h/o colon ca (2013, s/p resection and chemo) and prostate cancer (2015 s/o cyberknife treatment) present to ED with complain of severe abdominal pain for 1 day.  Found to have Colitis on CT.     Problem/Plan - 1:  ·  Problem: Acute colitis.   ·  Plan: CT abd/pelvis reported severe colitis involving mid to distal descending colon and rectosigmoid colon  Continue cipro and metronidazole   Advanced diet. Emphasized need for outpatient GI followup for colonoscopy and further evaluation.       Problem/Plan - 2:  ·  Problem: Bright red blood per rectum.   Fecal occult blood positive, Likely due to colitis.   No s/s of gross bleeding.  H/H stable.      Problem/Plan - 3:  ·  Problem: Benign essential HTN.   ·  Plan: BP elevated.  Adjust antihypertensives.      Problem/Plan - 4:  ·  Problem: History of colon cancer.   ·  Plan: h/o colon ca (2013, s/p resection and chemo).    # Inpatient DVT Prophylaxis - Lower extremity intermittent compression devices.   Plan d/c home once BP controlled.

## 2021-10-02 NOTE — PROGRESS NOTE ADULT - SUBJECTIVE AND OBJECTIVE BOX
Patient: NORMA ALLEN [id number] 80y Male                            Hospitalist Attending Note    Reports feeling better.  Abdominal pain resolved.  Tolerating po intake.  Feels well.    ____________________PHYSICAL EXAM:  GENERAL:  NAD Alert and Oriented x 3   HEENT: NCAT  CARDIOVASCULAR:  S1, S2  LUNGS: CTAB  ABDOMEN:  soft, (-) tenderness, (-) distension, (+) bowel sounds, (-) guarding, (-) rebound (-) rigidity  EXTREMITIES:  no cyanosis / clubbing / edema.   ____________________    VITALS:  Vital Signs Last 24 Hrs  T(C): 36.8 (02 Oct 2021 05:19), Max: 36.9 (01 Oct 2021 23:21)  T(F): 98.3 (02 Oct 2021 05:19), Max: 98.5 (01 Oct 2021 23:21)  HR: 61 (02 Oct 2021 05:19) (60 - 73)  BP: 164/80 (02 Oct 2021 05:19) (151/81 - 182/115)  BP(mean): --  RR: 18 (02 Oct 2021 05:19) (17 - 18)  SpO2: 98% (02 Oct 2021 05:19) (98% - 99%) Daily     Daily   CAPILLARY BLOOD GLUCOSE        I&O's Summary    01 Oct 2021 07:01  -  02 Oct 2021 07:00  --------------------------------------------------------  IN: 1480 mL / OUT: 900 mL / NET: 580 mL        LABS:                        13.4   5.52  )-----------( 141      ( 01 Oct 2021 07:37 )             40.7     10-01    136  |  103  |  12  ----------------------------<  125<H>  3.9   |  30  |  0.87    Ca    9.3      01 Oct 2021 07:37    TPro  7.1  /  Alb  3.1<L>  /  TBili  0.7  /  DBili  x   /  AST  23  /  ALT  17  /  AlkPhos  54  10-01      LIVER FUNCTIONS - ( 01 Oct 2021 07:37 )  Alb: 3.1 g/dL / Pro: 7.1 gm/dL / ALK PHOS: 54 U/L / ALT: 17 U/L / AST: 23 U/L / GGT: x                   Culture - Blood (collected 30 Sep 2021 00:43)  Source: .Blood Blood  Preliminary Report (01 Oct 2021 01:01):    No growth to date.    Culture - Blood (collected 30 Sep 2021 00:39)  Source: .Blood Blood  Preliminary Report (01 Oct 2021 01:01):    No growth to date.    Culture - Urine (collected 29 Sep 2021 18:53)  Source: Clean Catch Clean Catch (Midstream)  Final Report (30 Sep 2021 14:05):    <10,000 CFU/mL Normal Urogenital Lesli        MEDICATIONS:  acetaminophen   Tablet .. 650 milliGRAM(s) Oral every 6 hours PRN  amLODIPine   Tablet 5 milliGRAM(s) Oral daily  ciprofloxacin   IVPB 400 milliGRAM(s) IV Intermittent every 12 hours  hydrocortisone hemorrhoidal Suppository 1 Suppository(s) Rectal daily  influenza   Vaccine 0.5 milliLiter(s) IntraMuscular once  lidocaine 2% Gel 1 Application(s) Topical every 12 hours  lisinopril 5 milliGRAM(s) Oral daily  melatonin 3 milliGRAM(s) Oral at bedtime PRN  metroNIDAZOLE  IVPB 500 milliGRAM(s) IV Intermittent every 8 hours  ondansetron Injectable 4 milliGRAM(s) IV Push every 8 hours PRN        EKG SR 58bpm, . 
                          Patient: NORMA ALLEN [id number] 80y Male                            Hospitalist Attending Note    Reports feeling better.  Abdominal pain nearly resolved.  No diarrhea / nausea / vomiting.     ____________________PHYSICAL EXAM:  GENERAL:  NAD Alert and Oriented x 3   HEENT: NCAT  CARDIOVASCULAR:  S1, S2  LUNGS: CTAB  ABDOMEN:  soft, (-) tenderness, (-) distension, (+) bowel sounds, (-) guarding, (-) rebound (-) rigidity  EXTREMITIES:  no cyanosis / clubbing / edema.   ____________________     VITALS:  Vital Signs Last 24 Hrs  T(C): 36.8 (01 Oct 2021 17:03), Max: 37 (01 Oct 2021 06:04)  T(F): 98.2 (01 Oct 2021 17:03), Max: 98.6 (01 Oct 2021 06:04)  HR: 73 (01 Oct 2021 17:03) (60 - 73)  BP: 155/86 (01 Oct 2021 17:03) (137/83 - 182/115)  BP(mean): --  RR: 18 (01 Oct 2021 17:03) (17 - 18)  SpO2: 99% (01 Oct 2021 17:03) (99% - 99%) Daily     Daily   CAPILLARY BLOOD GLUCOSE        I&O's Summary    30 Sep 2021 07:01  -  01 Oct 2021 07:00  --------------------------------------------------------  IN: 720 mL / OUT: 0 mL / NET: 720 mL    01 Oct 2021 07:01  -  01 Oct 2021 19:01  --------------------------------------------------------  IN: 240 mL / OUT: 0 mL / NET: 240 mL        HISTORY:  PAST MEDICAL & SURGICAL HISTORY:  Colon cancer    Prostate cancer    Allergies    No Known Allergies    Intolerances       LABS:                        13.4   5.52  )-----------( 141      ( 01 Oct 2021 07:37 )             40.7       Culture - Blood (collected 30 Sep 2021 00:43)  Source: .Blood Blood  Preliminary Report (01 Oct 2021 01:01):    No growth to date.    Culture - Blood (collected 30 Sep 2021 00:39)  Source: .Blood Blood  Preliminary Report (01 Oct 2021 01:01):    No growth to date.    Culture - Urine (collected 29 Sep 2021 18:53)  Source: Clean Catch Clean Catch (Midstream)  Final Report (30 Sep 2021 14:05):    <10,000 CFU/mL Normal Urogenital Lesli    10-01    136  |  103  |  12  ----------------------------<  125<H>  3.9   |  30  |  0.87    Ca    9.3      01 Oct 2021 07:37  Phos  3.1     09-30  Mg     2.2     09-30    TPro  7.1  /  Alb  3.1<L>  /  TBili  0.7  /  DBili  x   /  AST  23  /  ALT  17  /  AlkPhos  54  10-01      LIVER FUNCTIONS - ( 01 Oct 2021 07:37 )  Alb: 3.1 g/dL / Pro: 7.1 gm/dL / ALK PHOS: 54 U/L / ALT: 17 U/L / AST: 23 U/L / GGT: x                 Culture - Blood (collected 30 Sep 2021 00:43)  Source: .Blood Blood  Preliminary Report (01 Oct 2021 01:01):    No growth to date.    Culture - Blood (collected 30 Sep 2021 00:39)  Source: .Blood Blood  Preliminary Report (01 Oct 2021 01:01):    No growth to date.    Culture - Urine (collected 29 Sep 2021 18:53)  Source: Clean Catch Clean Catch (Midstream)  Final Report (30 Sep 2021 14:05):    <10,000 CFU/mL Normal Urogenital Lesli          MEDICATIONS:  MEDICATIONS  (STANDING):  ciprofloxacin   IVPB 400 milliGRAM(s) IV Intermittent every 12 hours  hydrocortisone hemorrhoidal Suppository 1 Suppository(s) Rectal daily  influenza   Vaccine 0.5 milliLiter(s) IntraMuscular once  lidocaine 2% Gel 1 Application(s) Topical every 12 hours  lisinopril 5 milliGRAM(s) Oral daily  metroNIDAZOLE  IVPB 500 milliGRAM(s) IV Intermittent every 8 hours  sodium chloride 0.45%. 1000 milliLiter(s) (75 mL/Hr) IV Continuous <Continuous>    MEDICATIONS  (PRN):  acetaminophen   Tablet .. 650 milliGRAM(s) Oral every 6 hours PRN Temp greater or equal to 38C (100.4F), Mild Pain (1 - 3), Moderate Pain (4 - 6)  melatonin 3 milliGRAM(s) Oral at bedtime PRN Insomnia  ondansetron Injectable 4 milliGRAM(s) IV Push every 8 hours PRN Nausea and/or Vomiting  
Patient is a 80y old  Male who presents with a chief complaint of severe colitis (29 Sep 2021 16:45)    Patient today denies any abdominal pain or any bowel movements but c/o burning rectal pain.  Denies any fever, chills or rigors. Denies sob or chest pain.    SUBJECTIVE & OBJECTIVE: Pt seen and examined at bedside.   PHYSICAL EXAM:  Vital Signs Last 24 Hrs  T(C): 36.8 (30 Sep 2021 17:36), Max: 37.6 (29 Sep 2021 19:49)  T(F): 98.3 (30 Sep 2021 17:36), Max: 99.7 (29 Sep 2021 19:49)  HR: 65 (30 Sep 2021 17:36) (59 - 65)  BP: 163/75 (30 Sep 2021 17:36) (136/75 - 176/84)  BP(mean): --  RR: 18 (30 Sep 2021 17:36) (16 - 19)  SpO2: 100% (30 Sep 2021 17:36) (96% - 100%)   Daily Height in cm: 172.72 (29 Sep 2021 20:30)    Daily Weight in k.7 (30 Sep 2021 05:45)I&O's Detail    29 Sep 2021 07:01  -  30 Sep 2021 07:00  --------------------------------------------------------  IN:    IV PiggyBack: 200 mL    sodium chloride 0.45%: 525 mL  Total IN: 725 mL    OUT:    Voided (mL): 400 mL  Total OUT: 400 mL    Total NET: 325 mL    Patient is calm, comfortable and pleasant.    GENERAL: NAD, well-groomed, well-developed  HEAD:  Atraumatic, Normocephalic  EYES: EOMI, PERRLA, conjunctiva and sclera clear  ENMT: Moist mucous membranes  NECK: Supple, No JVD  NERVOUS SYSTEM:  Alert & Oriented X3, Motor Strength 5/5 B/L upper and lower extremities; DTRs 2+ intact and symmetric  CHEST/LUNG: Clear to auscultation bilaterally; No rales, rhonchi, wheezing, or rubs  HEART: Regular rate and rhythm; No murmurs, rubs, or gallops  ABDOMEN: Soft, Nontender, Nondistended; Bowel sounds present  EXTREMITIES:  2+ Peripheral Pulses, No clubbing, cyanosis, or edema  LABS:                        13.0   6.88  )-----------( 133      ( 30 Sep 2021 07:39 )             39.3   -    139  |  106  |  14  ----------------------------<  87  3.5   |  28  |  0.97    Ca    8.6      30 Sep 2021 07:39  Phos  3.1       Mg     2.2         TPro  8.0  /  Alb  3.7  /  TBili  0.9  /  DBili  x   /  AST  15  /  ALT  25  /  AlkPhos  63      Urinalysis Basic - ( 29 Sep 2021 14:09 )    Color: Yellow / Appearance: Clear / S.015 / pH: x  Gluc: x / Ketone: Negative  / Bili: Negative / Urobili: Negative mg/dL   Blood: x / Protein: Negative mg/dL / Nitrite: Negative   Leuk Esterase: Negative / RBC: x / WBC x   Sq Epi: x / Non Sq Epi: x / Bacteria: x      Culture - Urine (collected 29 Sep 2021 18:53)  Source: Clean Catch Clean Catch (Midstream)  Final Report (30 Sep 2021 14:05):    <10,000 CFU/mL Normal Urogenital Lesli        RADIOLOGY & ADDITIONAL TESTS:  < from: CT Abdomen and Pelvis w/ IV Cont (21 @ 14:37) >  IMPRESSION:  Severe colitis involving the mid to distal descending colon and rectosigmoid colon  2 small nonspecific splenic lesions in the superior spleen  2 tiny hypodensities in the left hepatic lobe of unlikely clinical significance  Small left adrenal nodule should be followed with CT or MR in 12 months.    MEDICATIONS  (STANDING):  ciprofloxacin   IVPB 400 milliGRAM(s) IV Intermittent every 12 hours  hydrocortisone hemorrhoidal Suppository 1 Suppository(s) Rectal daily  influenza   Vaccine 0.5 milliLiter(s) IntraMuscular once  lidocaine 2% Gel 1 Application(s) Topical every 12 hours  metroNIDAZOLE  IVPB 500 milliGRAM(s) IV Intermittent every 8 hours  sodium chloride 0.45%. 1000 milliLiter(s) (75 mL/Hr) IV Continuous <Continuous>    MEDICATIONS  (PRN):  acetaminophen   Tablet .. 650 milliGRAM(s) Oral every 6 hours PRN Temp greater or equal to 38C (100.4F), Mild Pain (1 - 3), Moderate Pain (4 - 6)  melatonin 3 milliGRAM(s) Oral at bedtime PRN Insomnia  ondansetron Injectable 4 milliGRAM(s) IV Push every 8 hours PRN Nausea and/or Vomiting

## 2021-10-02 NOTE — DISCHARGE NOTE PROVIDER - NSDCCPCAREPLAN_GEN_ALL_CORE_FT
PRINCIPAL DISCHARGE DIAGNOSIS  Diagnosis: Acute colitis  Assessment and Plan of Treatment:       SECONDARY DISCHARGE DIAGNOSES  Diagnosis: History of colon cancer  Assessment and Plan of Treatment:     Diagnosis: Benign essential HTN  Assessment and Plan of Treatment:     Diagnosis: Rectal bleeding  Assessment and Plan of Treatment:

## 2021-10-05 LAB
COVID-19 SPIKE DOMAIN AB INTERP: POSITIVE
COVID-19 SPIKE DOMAIN ANTIBODY RESULT: 97.2 U/ML — HIGH
CULTURE RESULTS: SIGNIFICANT CHANGE UP
CULTURE RESULTS: SIGNIFICANT CHANGE UP
SARS-COV-2 IGG+IGM SERPL QL IA: 97.2 U/ML — HIGH
SARS-COV-2 IGG+IGM SERPL QL IA: POSITIVE
SPECIMEN SOURCE: SIGNIFICANT CHANGE UP
SPECIMEN SOURCE: SIGNIFICANT CHANGE UP

## 2021-10-11 DIAGNOSIS — K52.9 NONINFECTIVE GASTROENTERITIS AND COLITIS, UNSPECIFIED: ICD-10-CM

## 2021-10-11 DIAGNOSIS — Z85.038 PERSONAL HISTORY OF OTHER MALIGNANT NEOPLASM OF LARGE INTESTINE: ICD-10-CM

## 2021-10-11 DIAGNOSIS — K62.5 HEMORRHAGE OF ANUS AND RECTUM: ICD-10-CM

## 2021-10-11 DIAGNOSIS — Z85.46 PERSONAL HISTORY OF MALIGNANT NEOPLASM OF PROSTATE: ICD-10-CM

## 2021-10-11 DIAGNOSIS — I10 ESSENTIAL (PRIMARY) HYPERTENSION: ICD-10-CM

## 2024-01-18 NOTE — DISCHARGE NOTE PROVIDER - REASON FOR ADMISSION
Call was returned to patient in regards to scheduling an follow up visit. Patient is scheduled on 4/26/24 at 2 PM with Dr Danielson. Appointment mailed. Patient confirmed and verbalized understanding.   severe colitis